# Patient Record
Sex: FEMALE | Race: OTHER | NOT HISPANIC OR LATINO
[De-identification: names, ages, dates, MRNs, and addresses within clinical notes are randomized per-mention and may not be internally consistent; named-entity substitution may affect disease eponyms.]

---

## 2019-03-27 ENCOUNTER — APPOINTMENT (OUTPATIENT)
Dept: PULMONOLOGY | Facility: CLINIC | Age: 79
End: 2019-03-27
Payer: MEDICARE

## 2019-03-27 VITALS
OXYGEN SATURATION: 97 % | SYSTOLIC BLOOD PRESSURE: 129 MMHG | BODY MASS INDEX: 28.93 KG/M2 | WEIGHT: 180 LBS | DIASTOLIC BLOOD PRESSURE: 82 MMHG | HEIGHT: 66 IN | HEART RATE: 77 BPM

## 2019-03-27 DIAGNOSIS — J45.909 UNSPECIFIED ASTHMA, UNCOMPLICATED: ICD-10-CM

## 2019-03-27 DIAGNOSIS — R06.2 WHEEZING: ICD-10-CM

## 2019-03-27 PROCEDURE — 94060 EVALUATION OF WHEEZING: CPT

## 2019-03-27 PROCEDURE — 99214 OFFICE O/P EST MOD 30 MIN: CPT | Mod: 25

## 2019-03-27 PROCEDURE — 71046 X-RAY EXAM CHEST 2 VIEWS: CPT

## 2019-03-27 RX ORDER — PREDNISONE 10 MG/1
10 TABLET ORAL
Qty: 12 | Refills: 0 | Status: DISCONTINUED | COMMUNITY
Start: 2019-03-22

## 2019-03-27 RX ORDER — ANASTROZOLE TABLETS 1 MG/1
1 TABLET ORAL
Qty: 90 | Refills: 0 | Status: DISCONTINUED | COMMUNITY
Start: 2018-11-27 | End: 2019-03-27

## 2019-03-27 RX ORDER — AZITHROMYCIN 250 MG/1
250 TABLET, FILM COATED ORAL
Qty: 6 | Refills: 0 | Status: DISCONTINUED | COMMUNITY
Start: 2019-03-22

## 2019-03-27 RX ORDER — LETROZOLE TABLETS 2.5 MG/1
2.5 TABLET, FILM COATED ORAL
Qty: 30 | Refills: 0 | Status: ACTIVE | COMMUNITY
Start: 2019-02-25

## 2019-03-27 RX ORDER — LEVOTHYROXINE SODIUM 0.07 MG/1
75 TABLET ORAL
Qty: 90 | Refills: 0 | Status: ACTIVE | COMMUNITY
Start: 2019-02-11

## 2019-03-27 RX ORDER — PREDNISONE 20 MG/1
20 TABLET ORAL
Qty: 8 | Refills: 0 | Status: DISCONTINUED | COMMUNITY
Start: 2019-03-20

## 2019-03-27 NOTE — PROCEDURE
[FreeTextEntry1] : Pulmonary function testing.\par There is a mild ventilatory impairment in a restrictive pattern. There is no significant bronchodilator response. \par \par NIOX 5\par \par PA and lateral chest radiograph reveals A normal heart and mediastinum with a tortuous aorta. There is mild hyperinflation and lung fields are clear bilaterally. Nodular density at the left lung base cannot be excluded. There is no significant pleural disease and bony structures are intact.\par \par There is no significant change compared to prior radiograph of August 9, 2017 except infusaport is no longer present. \par \par

## 2019-03-27 NOTE — HISTORY OF PRESENT ILLNESS
[FreeTextEntry1] : Developed cough 3/16 went to PMD sent home without treatment. Felt ill. \par Bad night. Pers. cough, sweats. Went to [facility] had neg. CXR\par discharged on prednisone and inhaler.\par Few days later went to [facility] tx inhaler Ventolin/prednisone 4/4/2/2/1/1 / Javonthro\par \par Now still with cough increased nocturnal.\par Cant get mucous out. Some wheezing. Not SOB.\par No fever. Significant upper airway congestion. PN Drip\par \par Some mild GERD.

## 2019-03-27 NOTE — PHYSICAL EXAM
[Lungs Percussion] : the lungs were normal to percussion [Abdomen Soft] : soft [Abdomen Tenderness] : non-tender [] : no hepato-splenomegaly [Nail Clubbing] : no clubbing of the fingernails [Cyanosis, Localized] : no localized cyanosis [FreeTextEntry1] : Few rhonchi and crackles bases.

## 2020-03-11 ENCOUNTER — APPOINTMENT (OUTPATIENT)
Dept: UROLOGY | Facility: CLINIC | Age: 80
End: 2020-03-11

## 2020-07-06 ENCOUNTER — APPOINTMENT (OUTPATIENT)
Dept: UROLOGY | Facility: CLINIC | Age: 80
End: 2020-07-06
Payer: MEDICARE

## 2020-07-06 VITALS
HEIGHT: 66 IN | WEIGHT: 187 LBS | TEMPERATURE: 99.9 F | RESPIRATION RATE: 17 BRPM | BODY MASS INDEX: 30.05 KG/M2 | DIASTOLIC BLOOD PRESSURE: 75 MMHG | SYSTOLIC BLOOD PRESSURE: 113 MMHG | HEART RATE: 78 BPM

## 2020-07-06 DIAGNOSIS — Z87.440 PERSONAL HISTORY OF URINARY (TRACT) INFECTIONS: ICD-10-CM

## 2020-07-06 PROCEDURE — 99204 OFFICE O/P NEW MOD 45 MIN: CPT

## 2020-07-06 RX ORDER — TRIAMTERENE AND HYDROCHLOROTHIAZIDE 25; 37.5 MG/1; MG/1
37.5-25 TABLET ORAL
Qty: 90 | Refills: 0 | Status: COMPLETED | COMMUNITY
Start: 2018-10-18 | End: 2020-07-06

## 2020-07-06 RX ORDER — ALBUTEROL SULFATE 90 UG/1
108 (90 BASE) AEROSOL, METERED RESPIRATORY (INHALATION)
Qty: 18 | Refills: 0 | Status: COMPLETED | COMMUNITY
Start: 2019-03-20 | End: 2020-07-06

## 2020-07-06 NOTE — ASSESSMENT
[FreeTextEntry1] : Recurrent UTI's\par restart cipro pending urine culture results\par urine culture sent\par \par she will try to obtain records (CT or ultrasound if done, urine culture )from hospital visit\par renal ultrasound ordered in case\par \par routine 3 months f/u appointment set.

## 2020-07-06 NOTE — HISTORY OF PRESENT ILLNESS
[Urinary Urgency] : urinary urgency [Urinary Frequency] : urinary frequency [Nocturia] : nocturia [None] : None [FreeTextEntry1] : 78y/o woman\par Hx of breast cancer treated with bilateral lumpectomy and chemotherapy and radiation. Now on Letrozole\par Here for evaluation of recurrent bouts of cystitis. Hospitalized in March 2020 before COVID. She was treated with antibiotics and then discharged. She had another episode recently and was treated with cipro. she stopped Cipro and then experienced a resurgence of her symptoms--frequency, urgency, no dysuria or hematuria.\par \par Unrelated c/o right sided chest pain, was evaluated by PCP and EKG was normal. Recent CXR was  from march 2020 was normal as well.\par \par Latest COVID test July 1, 20202 negative.\par \par Meds: Letrozole, Levothyroxine, Lipitor, Triamterene/HCTZ, Claritin prn.\par All: NKDA\par \par Exam: no CVAT, abd soft NT, ND, right chest pain localized to rib

## 2020-07-06 NOTE — PHYSICAL EXAM
[General Appearance - Well Nourished] : well nourished [General Appearance - Well Developed] : well developed [Normal Appearance] : normal appearance [General Appearance - In No Acute Distress] : no acute distress [Edema] : no peripheral edema [Well Groomed] : well groomed [] : no respiratory distress [Respiration, Rhythm And Depth] : normal respiratory rhythm and effort [Exaggerated Use Of Accessory Muscles For Inspiration] : no accessory muscle use [Abdomen Soft] : soft [Abdomen Tenderness] : non-tender [Costovertebral Angle Tenderness] : no ~M costovertebral angle tenderness [Urinary Bladder Findings] : the bladder was normal on palpation [Normal Station and Gait] : the gait and station were normal for the patient's age [Skin Color & Pigmentation] : normal skin color and pigmentation [Oriented To Time, Place, And Person] : oriented to person, place, and time [Affect] : the affect was normal [Mood] : the mood was normal [Not Anxious] : not anxious [No Palpable Adenopathy] : no palpable adenopathy [Cervical Lymph Nodes Enlarged Anterior Bilaterally] : anterior cervical [Cervical Lymph Nodes Enlarged Posterior Bilaterally] : posterior cervical [Supraclavicular Lymph Nodes Enlarged Bilaterally] : supraclavicular [Axillary Lymph Nodes Enlarged Bilaterally] : axillary [Femoral Lymph Nodes Enlarged Bilaterally] : femoral [Inguinal Lymph Nodes Enlarged Bilaterally] : inguinal [No Focal Deficits] : no focal deficits

## 2020-07-07 LAB
APPEARANCE: CLEAR
BACTERIA: NEGATIVE
BILIRUBIN URINE: NEGATIVE
BLOOD URINE: NORMAL
COLOR: YELLOW
GLUCOSE QUALITATIVE U: NEGATIVE
HYALINE CASTS: 3 /LPF
KETONES URINE: NORMAL
LEUKOCYTE ESTERASE URINE: NEGATIVE
MICROSCOPIC-UA: NORMAL
NITRITE URINE: NEGATIVE
PH URINE: 7.5
PROTEIN URINE: ABNORMAL
RED BLOOD CELLS URINE: 2 /HPF
SPECIFIC GRAVITY URINE: 1.02
SQUAMOUS EPITHELIAL CELLS: 2 /HPF
UROBILINOGEN URINE: NORMAL
WHITE BLOOD CELLS URINE: 2 /HPF

## 2020-07-08 LAB — BACTERIA UR CULT: NORMAL

## 2020-07-10 ENCOUNTER — APPOINTMENT (OUTPATIENT)
Dept: PULMONOLOGY | Facility: CLINIC | Age: 80
End: 2020-07-10
Payer: MEDICARE

## 2020-07-10 VITALS
WEIGHT: 185 LBS | DIASTOLIC BLOOD PRESSURE: 90 MMHG | TEMPERATURE: 98.3 F | HEART RATE: 78 BPM | HEIGHT: 63 IN | OXYGEN SATURATION: 96 % | SYSTOLIC BLOOD PRESSURE: 161 MMHG | BODY MASS INDEX: 32.78 KG/M2 | RESPIRATION RATE: 16 BRPM

## 2020-07-10 PROCEDURE — 99204 OFFICE O/P NEW MOD 45 MIN: CPT

## 2020-07-10 PROCEDURE — 99214 OFFICE O/P EST MOD 30 MIN: CPT

## 2020-07-10 RX ORDER — ALPRAZOLAM 0.25 MG/1
0.25 TABLET ORAL
Qty: 20 | Refills: 0 | Status: ACTIVE | COMMUNITY
Start: 2020-07-10 | End: 1900-01-01

## 2020-07-10 RX ORDER — TRIAMTERENE AND HYDROCHLOROTHIAZIDE 37.5; 25 MG/1; MG/1
37.5-25 CAPSULE ORAL
Refills: 0 | Status: ACTIVE | COMMUNITY
Start: 2020-07-10

## 2020-07-10 RX ORDER — CIPROFLOXACIN HYDROCHLORIDE 500 MG/1
500 TABLET, FILM COATED ORAL
Qty: 14 | Refills: 0 | Status: DISCONTINUED | COMMUNITY
Start: 2020-07-06 | End: 2020-07-10

## 2020-07-10 NOTE — PHYSICAL EXAM
[No Acute Distress] : no acute distress [Normal Oropharynx] : normal oropharynx [Normal S1, S2] : normal s1, s2 [Clear to Auscultation Bilaterally] : clear to auscultation bilaterally [Normal to Percussion] : normal to percussion [No Abnormalities] : no abnormalities [No Cyanosis] : no cyanosis [No Clubbing] : no clubbing [No Edema] : no edema

## 2020-07-13 NOTE — ASSESSMENT
[FreeTextEntry1] : Trial of PPI\par Patient to obtain and bring in CD of CT Chest and make further recommendations.

## 2020-07-13 NOTE — REASON FOR VISIT
[Follow-Up] : a follow-up visit [Abnormal CXR/ Chest CT] : an abnormal CXR/ chest CT [TextBox_44] : cough

## 2020-07-13 NOTE — DISCUSSION/SUMMARY
[FreeTextEntry1] : Lung lesions.  CAT scan films are not available for review.  Based on report suspicion of metastatic breast cancer.  Infectious inflammatory etiology cannot be excluded.  [facility] recommended a needle biopsy which likely is a reasonable approach.\par Cough of unclear etiology.  May be related to GERD.  Less likely related to pulmonary lesions.  Doubt cough variant asthma.  Cannot exclude upper airway contribution.

## 2020-07-13 NOTE — HISTORY OF PRESENT ILLNESS
[Never] : never [TextBox_4] : FLEX KINGSTON is a 79 year old  F referred for pulmonary evaluation for abnormal CAT scan.\par \par Patient last seen in our office in August 2017.\par  Patient complaining of cough mostly dry  for 1 week , went to [facility] with chest discomfort and had cta. covid negative. Was hospitalized in feb with a uti\par off Symbicort from last visit for over 1 year. Takes Claritin prn\par SIgnificant stress and sleeping poorly since hospital. \par brought disc to [facility] has not seen them as yet , breast oncologist\par Tx lumpectomy chemo and RT in [id number].\par ? Had RT to both breasts. \par \par No wheezing or SOB.\par ?GERD\par \par Called from [facility] and rec. bx of lung.

## 2020-09-09 ENCOUNTER — APPOINTMENT (OUTPATIENT)
Dept: PULMONOLOGY | Facility: CLINIC | Age: 80
End: 2020-09-09
Payer: MEDICARE

## 2020-09-09 VITALS
OXYGEN SATURATION: 99 % | HEART RATE: 65 BPM | TEMPERATURE: 97.8 F | DIASTOLIC BLOOD PRESSURE: 86 MMHG | SYSTOLIC BLOOD PRESSURE: 139 MMHG

## 2020-09-09 DIAGNOSIS — Z20.828 CONTACT WITH AND (SUSPECTED) EXPOSURE TO OTHER VIRAL COMMUNICABLE DISEASES: ICD-10-CM

## 2020-09-09 DIAGNOSIS — R91.8 OTHER NONSPECIFIC ABNORMAL FINDING OF LUNG FIELD: ICD-10-CM

## 2020-09-09 PROCEDURE — 36415 COLL VENOUS BLD VENIPUNCTURE: CPT | Mod: CS

## 2020-09-09 PROCEDURE — 94010 BREATHING CAPACITY TEST: CPT

## 2020-09-09 PROCEDURE — 71046 X-RAY EXAM CHEST 2 VIEWS: CPT

## 2020-09-09 PROCEDURE — 99214 OFFICE O/P EST MOD 30 MIN: CPT | Mod: CS,25

## 2020-09-09 RX ORDER — HYDROCODONE BITARTRATE AND HOMATROPINE METHYLBROMIDE 5; 1.5 MG/5ML; MG/5ML
5-1.5 SOLUTION ORAL
Qty: 150 | Refills: 0 | Status: DISCONTINUED | COMMUNITY
Start: 2020-07-10 | End: 2020-09-09

## 2020-09-09 NOTE — PHYSICAL EXAM
[No Acute Distress] : no acute distress [Normal Oropharynx] : normal oropharynx [Normal S1, S2] : normal s1, s2 [Clear to Auscultation Bilaterally] : clear to auscultation bilaterally [Normal to Percussion] : normal to percussion [No Clubbing] : no clubbing [No Abnormalities] : no abnormalities [No Cyanosis] : no cyanosis [No Edema] : no edema

## 2020-09-10 PROBLEM — R91.8 LUNG NODULES: Status: ACTIVE | Noted: 2020-07-10

## 2020-09-10 NOTE — DISCUSSION/SUMMARY
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  No significant respiratory symptoms.

## 2020-09-10 NOTE — HISTORY OF PRESENT ILLNESS
[Never] : never [TextBox_4] : S/p lung biopsy.  Transthoracic needle biopsy.\par By history positive organizing pneumonia.\par Bx was 7/17\par \par Feeling OK.\par Positive fatigue.\par No cough, wheeze or SOB.\par Otherwise well.\par

## 2020-09-10 NOTE — ASSESSMENT
[FreeTextEntry1] : Obtain and review all records.\par Patient to return for lung function testing.\par Labs drawn in office today\par Recommendations regarding need for treatment based upon above.\par \par \par Discussed with patient and daughter

## 2020-09-12 LAB — SARS-COV-2 N GENE NPH QL NAA+PROBE: NOT DETECTED

## 2020-09-14 ENCOUNTER — APPOINTMENT (OUTPATIENT)
Dept: PULMONOLOGY | Facility: CLINIC | Age: 80
End: 2020-09-14
Payer: MEDICARE

## 2020-09-14 VITALS
TEMPERATURE: 98.3 F | RESPIRATION RATE: 14 BRPM | DIASTOLIC BLOOD PRESSURE: 70 MMHG | SYSTOLIC BLOOD PRESSURE: 121 MMHG | HEART RATE: 68 BPM | OXYGEN SATURATION: 95 %

## 2020-09-14 LAB
ANA SER IF-ACNC: NEGATIVE
CRP SERPL-MCNC: 0.15 MG/DL
ENA SCL70 IGG SER IA-ACNC: <0.2 AL
POCT - HEMOGLOBIN (HGB), QUANTITATIVE, TRANSCUTANEOUS: 13.5

## 2020-09-14 PROCEDURE — 88738 HGB QUANT TRANSCUTANEOUS: CPT

## 2020-09-14 PROCEDURE — 99213 OFFICE O/P EST LOW 20 MIN: CPT | Mod: 25

## 2020-09-14 PROCEDURE — 94010 BREATHING CAPACITY TEST: CPT

## 2020-09-14 PROCEDURE — 94729 DIFFUSING CAPACITY: CPT

## 2020-09-14 PROCEDURE — 94727 GAS DIL/WSHOT DETER LNG VOL: CPT

## 2020-09-15 NOTE — PHYSICAL EXAM
[No Acute Distress] : no acute distress [Normal Oropharynx] : normal oropharynx [Normal to Percussion] : normal to percussion [Clear to Auscultation Bilaterally] : clear to auscultation bilaterally [Normal S1, S2] : normal s1, s2 [No Abnormalities] : no abnormalities [No Cyanosis] : no cyanosis [No Clubbing] : no clubbing [No Edema] : no edema

## 2020-09-15 NOTE — ASSESSMENT
[FreeTextEntry1] : Due to presence of normal lung function and lack of symptoms I do not feel compelled to treat this patient with corticosteroid therapy for organizing pneumonia at this time.\par This option was discussed with patient and daughter.  One benefit of corticosteroids besides treatment would be resolution of radiographic abnormalities would confirm diagnosis.\par Patient however does not wish to take steroids at this time.  We will closely observe.\par We will follow-up in a few months.  Patient is aware and urged to return sooner should she develop any new respiratory symptoms.\par \par \par Follow-up CAT scan and reevaluation in early November if no change in symptom complex.

## 2020-09-15 NOTE — PROCEDURE
[FreeTextEntry1] : 09/14/2020\par Pulmonary function testing\par FEV1, FVC, and FEV1/FVC are within normal limits. TLC and subdivisions are normal. RV/TLC ratio is normal. Single breath diffusion capacity is normal.

## 2020-09-15 NOTE — DISCUSSION/SUMMARY
[FreeTextEntry1] : Lung lesions likely related to organizing pneumonia.\par Neoplastic process cannot be absolutely excluded in light of biopsy being done as needle biopsy and multiple lesions.\par Normal lung function and asymptomatic.

## 2020-10-05 ENCOUNTER — APPOINTMENT (OUTPATIENT)
Dept: UROLOGY | Facility: CLINIC | Age: 80
End: 2020-10-05

## 2020-12-14 ENCOUNTER — APPOINTMENT (OUTPATIENT)
Dept: PULMONOLOGY | Facility: CLINIC | Age: 80
End: 2020-12-14

## 2020-12-23 PROBLEM — Z87.440 HISTORY OF URINARY TRACT INFECTION: Status: RESOLVED | Noted: 2020-07-06 | Resolved: 2020-12-23

## 2021-02-04 ENCOUNTER — APPOINTMENT (OUTPATIENT)
Dept: PULMONOLOGY | Facility: CLINIC | Age: 81
End: 2021-02-04
Payer: MEDICARE

## 2021-02-04 PROCEDURE — 99443: CPT | Mod: CS,95

## 2021-02-04 NOTE — HISTORY OF PRESENT ILLNESS
[Verbal consent obtained from patient] : the patient, [unfilled] [FreeTextEntry1] : 6 days ago got vaccine\par On way home started to fee nausea.\par Some arm pain.\par \par Next Am felt off.\par Tired and not feeling great.\par Developed a cough.\par Had COVID PCR\par Called today COVID positive.\par \par Positive cough.\par No significant SOB\par No fever\par Has sat monitor. Sat is \par No GI\par Some loss of apetitte.\par No loss of taste or smell.\par Sat 9597 pulse 85.\par \par COVID\par Co morbidiy organizing pneumonia.\par \par CROWN\par labs\par Monoclonal antibody.\par ? Remdesivir trial. Refusing.\par Baby ASA\par Nebs.\par Famotidine 20 BID\par \par

## 2021-02-09 ENCOUNTER — APPOINTMENT (OUTPATIENT)
Dept: PULMONOLOGY | Facility: CLINIC | Age: 81
End: 2021-02-09
Payer: MEDICARE

## 2021-02-09 PROCEDURE — 99443: CPT | Mod: CS,95

## 2021-02-09 NOTE — HISTORY OF PRESENT ILLNESS
[Home] : at home, [unfilled] , at the time of the visit. [Medical Office: ([facility])___] : at the medical office located in  [Verbal consent obtained from patient] : the patient, [unfilled] [FreeTextEntry1] : COVID positive 2/4\par Got monoclonal antibody yesterday.\par Persistent cough. Significant. No mucous production\par Some SOB\par Sats not measured today. Yesterday normal. \par Feels issue with lungs. \par No fever.\par Was called for labs. Coming today.\par \par \par For CXR ordered.\par For labs\par Continue Baby ASA\par Continue Pepcid.\par May need steroids.\par \par Close observation.\par \par \par

## 2021-02-10 ENCOUNTER — NON-APPOINTMENT (OUTPATIENT)
Age: 81
End: 2021-02-10

## 2021-02-10 LAB
ALBUMIN SERPL ELPH-MCNC: 4.1 G/DL
ALP BLD-CCNC: 78 U/L
ALT SERPL-CCNC: 70 U/L
ANION GAP SERPL CALC-SCNC: 24 MMOL/L
AST SERPL-CCNC: 78 U/L
BASOPHILS # BLD AUTO: 0.03 K/UL
BASOPHILS NFR BLD AUTO: 0.4 %
BILIRUB SERPL-MCNC: 0.8 MG/DL
BUN SERPL-MCNC: 19 MG/DL
CALCIUM SERPL-MCNC: 9.2 MG/DL
CHLORIDE SERPL-SCNC: 95 MMOL/L
CO2 SERPL-SCNC: 21 MMOL/L
CREAT SERPL-MCNC: 1.17 MG/DL
CRP SERPL-MCNC: 9.73 MG/DL
DEPRECATED D DIMER PPP IA-ACNC: 395 NG/ML DDU
EOSINOPHIL # BLD AUTO: 0.07 K/UL
EOSINOPHIL NFR BLD AUTO: 0.9 %
FERRITIN SERPL-MCNC: 934 NG/ML
GLUCOSE SERPL-MCNC: 160 MG/DL
HCT VFR BLD CALC: 44.5 %
HGB BLD-MCNC: 14.9 G/DL
IMM GRANULOCYTES NFR BLD AUTO: 0.5 %
LYMPHOCYTES # BLD AUTO: 1.69 K/UL
LYMPHOCYTES NFR BLD AUTO: 21.3 %
MAN DIFF?: NORMAL
MCHC RBC-ENTMCNC: 30.2 PG
MCHC RBC-ENTMCNC: 33.5 GM/DL
MCV RBC AUTO: 90.3 FL
MONOCYTES # BLD AUTO: 0.46 K/UL
MONOCYTES NFR BLD AUTO: 5.8 %
NEUTROPHILS # BLD AUTO: 5.66 K/UL
NEUTROPHILS NFR BLD AUTO: 71.1 %
PLATELET # BLD AUTO: 276 K/UL
POTASSIUM SERPL-SCNC: 2.8 MMOL/L
PROCALCITONIN SERPL-MCNC: 0.19 NG/ML
PROT SERPL-MCNC: 7.1 G/DL
RBC # BLD: 4.93 M/UL
RBC # FLD: 14 %
SODIUM SERPL-SCNC: 140 MMOL/L
WBC # FLD AUTO: 7.95 K/UL

## 2021-02-11 RX ORDER — HYDROCODONE BITARTRATE AND HOMATROPINE METHYLBROMIDE 5; 1.5 MG/5ML; MG/5ML
5-1.5 SOLUTION ORAL
Qty: 300 | Refills: 0 | Status: ACTIVE | COMMUNITY
Start: 2021-02-11 | End: 1900-01-01

## 2021-02-13 ENCOUNTER — NON-APPOINTMENT (OUTPATIENT)
Age: 81
End: 2021-02-13

## 2021-02-13 LAB
ANION GAP SERPL CALC-SCNC: 15 MMOL/L
BUN SERPL-MCNC: 21 MG/DL
CALCIUM SERPL-MCNC: 9.1 MG/DL
CHLORIDE SERPL-SCNC: 99 MMOL/L
CO2 SERPL-SCNC: 24 MMOL/L
CREAT SERPL-MCNC: 1.08 MG/DL
GLUCOSE SERPL-MCNC: 91 MG/DL
POTASSIUM SERPL-SCNC: 3.9 MMOL/L
SODIUM SERPL-SCNC: 138 MMOL/L

## 2021-02-13 NOTE — HISTORY OF PRESENT ILLNESS
[Home] : at home, [unfilled] , at the time of the visit. [Other Location: e.g. Home (Enter Location, City,State)___] : at [unfilled] [FreeTextEntry1] : Feeling relatively stable except bad cough\par No fever\par Sats good\par Repeat potassium improved\par Took medrol marie\par Had monoclonal antibody\par \par Continue present management and observe\par Follow sats\par

## 2021-02-17 ENCOUNTER — NON-APPOINTMENT (OUTPATIENT)
Age: 81
End: 2021-02-17

## 2021-02-17 ENCOUNTER — APPOINTMENT (OUTPATIENT)
Dept: PULMONOLOGY | Facility: CLINIC | Age: 81
End: 2021-02-17

## 2021-02-19 ENCOUNTER — APPOINTMENT (OUTPATIENT)
Dept: PULMONOLOGY | Facility: CLINIC | Age: 81
End: 2021-02-19

## 2021-02-19 NOTE — PLAN
[FreeTextEntry1] : Feeling much better\par Still fatigue.\par No fever\par No SOB\par Significant improvement in cough.\par \par For office f/u\par \par

## 2021-02-23 ENCOUNTER — APPOINTMENT (OUTPATIENT)
Dept: PULMONOLOGY | Facility: CLINIC | Age: 81
End: 2021-02-23
Payer: MEDICARE

## 2021-02-23 VITALS
TEMPERATURE: 97.4 F | SYSTOLIC BLOOD PRESSURE: 167 MMHG | HEART RATE: 70 BPM | OXYGEN SATURATION: 98 % | HEIGHT: 63 IN | WEIGHT: 185 LBS | DIASTOLIC BLOOD PRESSURE: 82 MMHG | BODY MASS INDEX: 32.78 KG/M2

## 2021-02-23 PROCEDURE — 71046 X-RAY EXAM CHEST 2 VIEWS: CPT

## 2021-02-23 PROCEDURE — 99214 OFFICE O/P EST MOD 30 MIN: CPT | Mod: CS,25

## 2021-02-23 NOTE — DISCUSSION/SUMMARY
[FreeTextEntry1] : Status post Covid virus infection with Covid pneumonia clinically and radiographically resolving.\par Prior lung lesions likely related to organizing pneumonia.\par Neoplastic process cannot be absolutely excluded in light of biopsy being done as needle biopsy and multiple lesions.\par

## 2021-02-23 NOTE — ASSESSMENT
[FreeTextEntry1] : Observation.\par Follow-up in 6 to 8 weeks for repeat radiograph and lung function testing.\par Follow-up sooner on a as needed basis.

## 2021-02-23 NOTE — PHYSICAL EXAM
[No Acute Distress] : no acute distress [Supple] : supple [No JVD] : no jvd [Normal S1, S2] : normal s1, s2 [No Murmurs] : no murmurs [Clear to Auscultation Bilaterally] : clear to auscultation bilaterally [Normal to Percussion] : normal to percussion [No Abnormalities] : no abnormalities [Benign] : benign [No Clubbing] : no clubbing [No Cyanosis] : no cyanosis [No Edema] : no edema

## 2021-02-23 NOTE — HISTORY OF PRESENT ILLNESS
[TextBox_4] : S/P COVID\par Had repeat swab negative.\par Going Friday to dentist.\par Feeling much better.\par Still some cough has significantly improved. \par No SOB.\par \par

## 2021-03-12 ENCOUNTER — RX RENEWAL (OUTPATIENT)
Age: 81
End: 2021-03-12

## 2021-03-12 RX ORDER — POTASSIUM CHLORIDE 1500 MG/1
20 TABLET, FILM COATED, EXTENDED RELEASE ORAL
Qty: 60 | Refills: 0 | Status: ACTIVE | COMMUNITY
Start: 2021-02-10 | End: 1900-01-01

## 2021-04-16 ENCOUNTER — APPOINTMENT (OUTPATIENT)
Dept: PULMONOLOGY | Facility: CLINIC | Age: 81
End: 2021-04-16
Payer: MEDICARE

## 2021-04-16 ENCOUNTER — LABORATORY RESULT (OUTPATIENT)
Age: 81
End: 2021-04-16

## 2021-04-16 VITALS — SYSTOLIC BLOOD PRESSURE: 119 MMHG | HEART RATE: 67 BPM | OXYGEN SATURATION: 98 % | DIASTOLIC BLOOD PRESSURE: 75 MMHG

## 2021-04-16 DIAGNOSIS — E87.6 HYPOKALEMIA: ICD-10-CM

## 2021-04-16 PROCEDURE — 71046 X-RAY EXAM CHEST 2 VIEWS: CPT

## 2021-04-16 PROCEDURE — 99213 OFFICE O/P EST LOW 20 MIN: CPT | Mod: 25,CS

## 2021-04-18 NOTE — PHYSICAL EXAM
[No Acute Distress] : no acute distress [Normal S1, S2] : normal s1, s2 [Clear to Auscultation Bilaterally] : clear to auscultation bilaterally [Normal to Percussion] : normal to percussion [No Abnormalities] : no abnormalities [No Clubbing] : no clubbing [No Cyanosis] : no cyanosis [No Edema] : no edema [Supple] : supple [No JVD] : no jvd [No Murmurs] : no murmurs [Benign] : benign [Not Tender] : not tender [No HSM] : no hsm

## 2021-04-18 NOTE — ASSESSMENT
[FreeTextEntry1] : Observation.\par Repeat lung function testing on return to office.\par Labs drawn in office today\par

## 2021-04-18 NOTE — DISCUSSION/SUMMARY
[FreeTextEntry1] : Status post Covid virus infection with Covid pneumonia clinically and radiographically resolved\par Prior lung lesions likely related to organizing pneumonia.\par Neoplastic process cannot be absolutely excluded in light of biopsy being done as needle biopsy and multiple lesions.\par

## 2021-04-18 NOTE — HISTORY OF PRESENT ILLNESS
[TextBox_4] : Feeling well\par \par No significant cough, wheezing, chest pain or SOB.\par Symptoms of COVID infection resolved.

## 2021-04-19 LAB
ALBUMIN SERPL ELPH-MCNC: 4.6 G/DL
ALP BLD-CCNC: 74 U/L
ALT SERPL-CCNC: 16 U/L
ANION GAP SERPL CALC-SCNC: 14 MMOL/L
AST SERPL-CCNC: 20 U/L
BASOPHILS # BLD AUTO: 0.04 K/UL
BASOPHILS NFR BLD AUTO: 0.6 %
BILIRUB SERPL-MCNC: 0.6 MG/DL
BUN SERPL-MCNC: 14 MG/DL
CALCIUM SERPL-MCNC: 9.6 MG/DL
CHLORIDE SERPL-SCNC: 105 MMOL/L
CO2 SERPL-SCNC: 24 MMOL/L
CREAT SERPL-MCNC: 0.83 MG/DL
CRP SERPL-MCNC: <3 MG/L
DEPRECATED D DIMER PPP IA-ACNC: 221 NG/ML DDU
EOSINOPHIL # BLD AUTO: 0.19 K/UL
EOSINOPHIL NFR BLD AUTO: 2.8 %
FERRITIN SERPL-MCNC: 145 NG/ML
GLUCOSE SERPL-MCNC: 93 MG/DL
HCT VFR BLD CALC: 43.5 %
HGB BLD-MCNC: 14.2 G/DL
IMM GRANULOCYTES NFR BLD AUTO: 0.3 %
LYMPHOCYTES # BLD AUTO: 1.25 K/UL
LYMPHOCYTES NFR BLD AUTO: 18.5 %
MAN DIFF?: NORMAL
MCHC RBC-ENTMCNC: 31.6 PG
MCHC RBC-ENTMCNC: 32.6 GM/DL
MCV RBC AUTO: 96.7 FL
MONOCYTES # BLD AUTO: 0.38 K/UL
MONOCYTES NFR BLD AUTO: 5.6 %
NEUTROPHILS # BLD AUTO: 4.87 K/UL
NEUTROPHILS NFR BLD AUTO: 72.2 %
PLATELET # BLD AUTO: 204 K/UL
POTASSIUM SERPL-SCNC: 4.7 MMOL/L
PROT SERPL-MCNC: 6.7 G/DL
RBC # BLD: 4.5 M/UL
RBC # FLD: 14.8 %
SODIUM SERPL-SCNC: 142 MMOL/L
WBC # FLD AUTO: 6.75 K/UL

## 2021-07-19 DIAGNOSIS — Z23 ENCOUNTER FOR IMMUNIZATION: ICD-10-CM

## 2021-07-20 ENCOUNTER — APPOINTMENT (OUTPATIENT)
Dept: PULMONOLOGY | Facility: CLINIC | Age: 81
End: 2021-07-20
Payer: MEDICARE

## 2021-07-20 VITALS
RESPIRATION RATE: 14 BRPM | WEIGHT: 178 LBS | BODY MASS INDEX: 31.54 KG/M2 | HEIGHT: 63 IN | TEMPERATURE: 97.3 F | DIASTOLIC BLOOD PRESSURE: 76 MMHG | HEART RATE: 65 BPM | OXYGEN SATURATION: 97 % | SYSTOLIC BLOOD PRESSURE: 126 MMHG

## 2021-07-20 DIAGNOSIS — U07.1 COVID-19: ICD-10-CM

## 2021-07-20 PROCEDURE — 94729 DIFFUSING CAPACITY: CPT

## 2021-07-20 PROCEDURE — 94727 GAS DIL/WSHOT DETER LNG VOL: CPT

## 2021-07-20 PROCEDURE — ZZZZZ: CPT

## 2021-07-20 PROCEDURE — 94010 BREATHING CAPACITY TEST: CPT

## 2021-07-20 PROCEDURE — 99213 OFFICE O/P EST LOW 20 MIN: CPT | Mod: CS,25

## 2021-07-20 NOTE — PHYSICAL EXAM
[No Acute Distress] : no acute distress [Supple] : supple [No JVD] : no jvd [Normal S1, S2] : normal s1, s2 [No Murmurs] : no murmurs [Clear to Auscultation Bilaterally] : clear to auscultation bilaterally [Normal to Percussion] : normal to percussion [No Abnormalities] : no abnormalities [Not Tender] : not tender [Benign] : benign [No HSM] : no hsm [No Clubbing] : no clubbing [No Cyanosis] : no cyanosis [No Edema] : no edema

## 2021-07-21 NOTE — DISCUSSION/SUMMARY
[FreeTextEntry1] : Status post Covid virus infection with Covid pneumonia clinically and radiographically resolved.\par Lung function back to baseline.\par Prior lung lesions likely related to organizing pneumonia.\par Neoplastic process cannot be absolutely excluded

## 2021-07-21 NOTE — HISTORY OF PRESENT ILLNESS
[TextBox_4] : Feeling well\par \par No significant cough, wheezing, chest pain or SOB.\par Symptoms of COVID infection resolved. \par No residual abnormalities\par Got vaccinated. \par \par Going to [facility] 9/10

## 2021-07-21 NOTE — PROCEDURE
[FreeTextEntry1] : 07/20/2021\par Pulmonary function testing\par FEV1, FVC, and FEV1/FVC are within normal limits. TLC and subdivisions are normal. RV/TLC ratio is normal. Single breath diffusion capacity is normal. \par PFT attached relatively stable function.\par

## 2021-07-21 NOTE — ASSESSMENT
[FreeTextEntry1] : Observation.\par For follow-up CAT scan via [facility].  Recommended patient go in September or October.

## 2021-07-30 ENCOUNTER — APPOINTMENT (OUTPATIENT)
Dept: PULMONOLOGY | Facility: CLINIC | Age: 81
End: 2021-07-30
Payer: MEDICARE

## 2021-07-30 VITALS
OXYGEN SATURATION: 95 % | SYSTOLIC BLOOD PRESSURE: 134 MMHG | HEART RATE: 77 BPM | DIASTOLIC BLOOD PRESSURE: 81 MMHG | TEMPERATURE: 97.8 F

## 2021-07-30 DIAGNOSIS — B34.9 VIRAL INFECTION, UNSPECIFIED: ICD-10-CM

## 2021-07-30 PROCEDURE — 99213 OFFICE O/P EST LOW 20 MIN: CPT | Mod: 25

## 2021-07-30 PROCEDURE — 36415 COLL VENOUS BLD VENIPUNCTURE: CPT

## 2021-07-30 RX ORDER — FLUTICASONE PROPIONATE 50 UG/1
50 SPRAY, METERED NASAL
Qty: 1 | Refills: 4 | Status: ACTIVE | COMMUNITY
Start: 2021-07-30 | End: 1900-01-01

## 2021-07-30 NOTE — ASSESSMENT
[FreeTextEntry1] : Add Flonase\par Allegra\par RVP\par Follow-up in 1 to 2 weeks if not improved.\par For CT chest.

## 2021-07-30 NOTE — DISCUSSION/SUMMARY
[FreeTextEntry1] : Patient symptoms seem predominantly upper airway in origin.\par More likely related to viral syndrome then other etiologies including exacerbation of organizing pneumonia.

## 2021-07-30 NOTE — HISTORY OF PRESENT ILLNESS
[TextBox_4] : \par \par \par 5-7 days of cough and upper airway congestion\par No fever\par Non productive\par Feels from upper airway\par [place] in city [place] x 2 treated Doxy and prednisone 20 OD for 5 days.Had CXR told negative.\par \par Has appt  8/9 for CT.\par \par Very agitated regarding above.

## 2021-08-02 LAB
BASOPHILS # BLD AUTO: 0.03 K/UL
BASOPHILS NFR BLD AUTO: 0.3 %
EOSINOPHIL # BLD AUTO: 0.03 K/UL
EOSINOPHIL NFR BLD AUTO: 0.3 %
HCT VFR BLD CALC: 45 %
HGB BLD-MCNC: 15.7 G/DL
IMM GRANULOCYTES NFR BLD AUTO: 0.9 %
LYMPHOCYTES # BLD AUTO: 1.11 K/UL
LYMPHOCYTES NFR BLD AUTO: 10.8 %
MAN DIFF?: NORMAL
MCHC RBC-ENTMCNC: 31.7 PG
MCHC RBC-ENTMCNC: 34.9 GM/DL
MCV RBC AUTO: 90.7 FL
MONOCYTES # BLD AUTO: 0.72 K/UL
MONOCYTES NFR BLD AUTO: 7 %
NEUTROPHILS # BLD AUTO: 8.32 K/UL
NEUTROPHILS NFR BLD AUTO: 80.7 %
PLATELET # BLD AUTO: 286 K/UL
RAPID RVP RESULT: DETECTED
RBC # BLD: 4.96 M/UL
RBC # FLD: 14.6 %
RV+EV RNA SPEC QL NAA+PROBE: DETECTED
SARS-COV-2 RNA PNL RESP NAA+PROBE: NOT DETECTED
WBC # FLD AUTO: 10.3 K/UL

## 2021-08-09 ENCOUNTER — APPOINTMENT (OUTPATIENT)
Dept: CT IMAGING | Facility: CLINIC | Age: 81
End: 2021-08-09
Payer: MEDICARE

## 2021-08-09 ENCOUNTER — OUTPATIENT (OUTPATIENT)
Dept: OUTPATIENT SERVICES | Facility: HOSPITAL | Age: 81
LOS: 1 days | End: 2021-08-09
Payer: MEDICARE

## 2021-08-09 DIAGNOSIS — J84.89 OTHER SPECIFIED INTERSTITIAL PULMONARY DISEASES: ICD-10-CM

## 2021-08-09 PROCEDURE — 71250 CT THORAX DX C-: CPT | Mod: 26,MH

## 2021-08-09 PROCEDURE — 71250 CT THORAX DX C-: CPT

## 2022-01-18 ENCOUNTER — APPOINTMENT (OUTPATIENT)
Dept: PULMONOLOGY | Facility: CLINIC | Age: 82
End: 2022-01-18

## 2022-06-20 ENCOUNTER — APPOINTMENT (OUTPATIENT)
Dept: PULMONOLOGY | Facility: CLINIC | Age: 82
End: 2022-06-20
Payer: MEDICARE

## 2022-06-20 VITALS
HEART RATE: 64 BPM | SYSTOLIC BLOOD PRESSURE: 145 MMHG | TEMPERATURE: 97.6 F | DIASTOLIC BLOOD PRESSURE: 80 MMHG | OXYGEN SATURATION: 95 %

## 2022-06-20 DIAGNOSIS — R05.9 COUGH, UNSPECIFIED: ICD-10-CM

## 2022-06-20 PROCEDURE — 99214 OFFICE O/P EST MOD 30 MIN: CPT | Mod: 25

## 2022-06-20 PROCEDURE — 71046 X-RAY EXAM CHEST 2 VIEWS: CPT

## 2022-06-20 RX ORDER — PREDNISONE 10 MG/1
10 TABLET ORAL
Qty: 18 | Refills: 0 | Status: ACTIVE | COMMUNITY
Start: 2022-06-20 | End: 1900-01-01

## 2022-06-20 RX ORDER — NITROFURANTOIN (MONOHYDRATE/MACROCRYSTALS) 25; 75 MG/1; MG/1
100 CAPSULE ORAL
Qty: 14 | Refills: 0 | Status: DISCONTINUED | COMMUNITY
Start: 2022-02-19

## 2022-06-20 RX ORDER — OMEPRAZOLE 40 MG/1
40 CAPSULE, DELAYED RELEASE ORAL
Qty: 30 | Refills: 0 | Status: ACTIVE | COMMUNITY
Start: 2022-06-20 | End: 1900-01-01

## 2022-06-20 NOTE — CONSULT LETTER
[Dear  ___] : Dear ~DEJAH, [Consult Closing:] : Thank you very much for allowing me to participate in the care of this patient.  If you have any questions, please do not hesitate to contact me. [Sincerely,] : Sincerely, [FreeTextEntry2] : Tucker Ramos MD [FreeTextEntry3] : Angelo Pavon MD FCCP\par

## 2022-06-20 NOTE — PHYSICAL EXAM
[No Acute Distress] : no acute distress [Supple] : supple [No JVD] : no jvd [Normal S1, S2] : normal s1, s2 [No Murmurs] : no murmurs [Normal to Percussion] : normal to percussion [No Abnormalities] : no abnormalities [Benign] : benign [Not Tender] : not tender [No HSM] : no hsm [No Clubbing] : no clubbing [No Cyanosis] : no cyanosis [No Edema] : no edema [TextBox_68] : rhonchi right bas

## 2022-06-20 NOTE — ASSESSMENT
[FreeTextEntry1] : Course of prednisone.\par Trial of omeprazole prior to dinner.\par Follow-up in few weeks if cough does not resolve.\par Decision on need for follow-up CT based on clinical scenario.\par \par 35 minutes spent in evaluation management and review of studies.

## 2022-06-20 NOTE — HISTORY OF PRESENT ILLNESS
[TextBox_4] : 3 weeks ago went to [facility] and had negative covid and flu\par treated with z Abhishek without help,pt added Medrol Abhishek, finished over 1 week ago, pt thinks Medrol was \par \par still with cough, makes her become incontinent.\par very little mucus\par No wheeze\par No sob\par Some improvement with cough but still present. \par Dry cough.\par No GERD\par Freq. clearing throat\par Some upper airway congestion\par \par last CT 2021\par \par

## 2022-06-20 NOTE — PROCEDURE
[FreeTextEntry1] : 06/20/2022 \par PA and lateral chest radiograph \par \par 07/20/2021\par Pulmonary function testing\par FEV1, FVC, and FEV1/FVC are within normal limits. TLC and subdivisions are normal. RV/TLC ratio is normal. Single breath diffusion capacity is normal. \par PFT attached relatively stable function.\par

## 2022-06-20 NOTE — DISCUSSION/SUMMARY
[FreeTextEntry1] : Most likely cough related to post infectious inflammatory bronchitis.  Rule out component of laryngeal pharyngeal reflux disease.  Possible upper airway contribution.\par Less likely related to exacerbation of organizing pneumonia.

## 2022-10-10 ENCOUNTER — APPOINTMENT (OUTPATIENT)
Dept: CT IMAGING | Facility: CLINIC | Age: 82
End: 2022-10-10

## 2022-10-10 ENCOUNTER — APPOINTMENT (OUTPATIENT)
Dept: MRI IMAGING | Facility: CLINIC | Age: 82
End: 2022-10-10

## 2022-10-10 PROCEDURE — 72141 MRI NECK SPINE W/O DYE: CPT | Mod: MH

## 2022-10-12 ENCOUNTER — OUTPATIENT (OUTPATIENT)
Dept: OUTPATIENT SERVICES | Facility: HOSPITAL | Age: 82
LOS: 1 days | End: 2022-10-12
Payer: MEDICARE

## 2022-10-12 ENCOUNTER — APPOINTMENT (OUTPATIENT)
Dept: NUCLEAR MEDICINE | Facility: IMAGING CENTER | Age: 82
End: 2022-10-12

## 2022-10-12 DIAGNOSIS — M89.9 DISORDER OF BONE, UNSPECIFIED: ICD-10-CM

## 2022-10-12 PROCEDURE — 78815 PET IMAGE W/CT SKULL-THIGH: CPT

## 2022-10-12 PROCEDURE — 78815 PET IMAGE W/CT SKULL-THIGH: CPT | Mod: 26,PI,MH

## 2022-10-12 PROCEDURE — A9552: CPT

## 2022-11-22 ENCOUNTER — APPOINTMENT (OUTPATIENT)
Dept: PULMONOLOGY | Facility: CLINIC | Age: 82
End: 2022-11-22

## 2022-11-23 ENCOUNTER — APPOINTMENT (OUTPATIENT)
Dept: PULMONOLOGY | Facility: CLINIC | Age: 82
End: 2022-11-23

## 2023-06-19 ENCOUNTER — APPOINTMENT (OUTPATIENT)
Dept: PULMONOLOGY | Facility: CLINIC | Age: 83
End: 2023-06-19
Payer: MEDICARE

## 2023-06-19 DIAGNOSIS — M89.9 DISORDER OF BONE, UNSPECIFIED: ICD-10-CM

## 2023-06-19 PROCEDURE — 99213 OFFICE O/P EST LOW 20 MIN: CPT | Mod: 95

## 2023-06-19 NOTE — DISCUSSION/SUMMARY
[FreeTextEntry1] : On treatment for metastatic breast cancer.\par As per pet imaging report there are new parenchymal opacities.\par Patient presently not ill.\par Could be related to infectious etiology, drug-induced or organizing pneumonia for which patient has prior history.

## 2023-06-19 NOTE — PROCEDURE
[FreeTextEntry1] : Pet imaging June 16, 2023 noted.\par Films not available for review.\par Described a new patchy subpleural groundglass and consolidative opacities with minimal FDG uptake.  Right

## 2023-06-19 NOTE — HISTORY OF PRESENT ILLNESS
[Home] : at home, [unfilled] , at the time of the visit. [Medical Office: ([facility])___] : at the medical office located in  [TextBox_4] : This visit was provided via telehealth using real-time 2-way audio visual technology. The patient, FLEX KINGSTON , was located at home, [address]\[place] [address]  at the time of the visit.\par The provider, Angelo Pavon, was located at office [address] [place] [address] at the time of the visit. \par \par  The patient, Ms. FLEX KINGSTON  and Physician Angelo June participated in the telehealth encounter.\par \par Verbal consent obtained by  from patient\par \par On chemo and had PET scan.\par Treated at [facility] tab\par no significant resp, complaints.\par Prior cough.  Now improved.  Denies fever or chills.  Not ill.\par Denies chest pain or shortness of breath.\par Has follow-up appointment Wednesday at [facility].\par On [place]

## 2023-06-19 NOTE — ASSESSMENT
[FreeTextEntry1] : Since patient is clinically well I have recommended she follow-up at [facility].\adams Has appointment in 2 days.\adams If she wishes further recommendations from our office would need to follow-up here with copy of films and old films if possible.\par Patient is instructed to call should she develop any increasing respiratory symptoms.\adams Would hold on antibiotics for further therapy at this time.

## 2024-04-22 ENCOUNTER — APPOINTMENT (OUTPATIENT)
Dept: PULMONOLOGY | Facility: CLINIC | Age: 84
End: 2024-04-22
Payer: MEDICARE

## 2024-04-22 VITALS — SYSTOLIC BLOOD PRESSURE: 158 MMHG | HEART RATE: 63 BPM | OXYGEN SATURATION: 98 % | DIASTOLIC BLOOD PRESSURE: 91 MMHG

## 2024-04-22 DIAGNOSIS — C50.111 MALIGNANT NEOPLASM OF CENTRAL PORTION OF RIGHT FEMALE BREAST: ICD-10-CM

## 2024-04-22 DIAGNOSIS — J84.89 OTHER SPECIFIED INTERSTITIAL PULMONARY DISEASES: ICD-10-CM

## 2024-04-22 PROCEDURE — 99214 OFFICE O/P EST MOD 30 MIN: CPT

## 2024-04-25 NOTE — DISCUSSION/SUMMARY
[FreeTextEntry1] : On treatment for metastatic breast cancer. Patient presently not ill. Reported change in CT and concern regarding potential drug toxicity.  Cannot exclude infectious etiology or progressive metastatic disease.

## 2024-04-25 NOTE — ASSESSMENT
[FreeTextEntry1] : PFT presently refusing. Asked patient and daughter to obtain films as well as old films for review and comparison and attempt to make a recommendation regarding etiology of parenchymal infiltrates and ability to continue treatment versus change in therapy or biopsy. Will review all films when available.  Patient and daughter aware need actual films not report.    35 minutes spent in evaluation management and review of studies.

## 2024-04-25 NOTE — PROCEDURE
[FreeTextEntry1] : Patient had recent CAT scan April 16, 2024. Films as well as old films not available for direct review.  Have reports. Described left upper lobe bandlike consolidative opacity increased compared to PET scan February 2024 and prior CT October 2020.  Mild increase in right upper lobe subpleural nodule.  Subpleural reticular opacities bilaterally upper and lower lobes reportedly unchanged compared to February CT PET 2024 but new compared to 2020. No adenopathy reported.

## 2024-05-06 ENCOUNTER — APPOINTMENT (OUTPATIENT)
Dept: PULMONOLOGY | Facility: CLINIC | Age: 84
End: 2024-05-06
Payer: MEDICARE

## 2024-05-06 VITALS
DIASTOLIC BLOOD PRESSURE: 69 MMHG | BODY MASS INDEX: 23.56 KG/M2 | OXYGEN SATURATION: 96 % | WEIGHT: 133 LBS | HEART RATE: 70 BPM | SYSTOLIC BLOOD PRESSURE: 119 MMHG

## 2024-05-06 DIAGNOSIS — R93.89 ABNORMAL FINDINGS ON DIAGNOSTIC IMAGING OF OTHER SPECIFIED BODY STRUCTURES: ICD-10-CM

## 2024-05-06 LAB — POCT - HEMOGLOBIN (HGB), QUANTITATIVE, TRANSCUTANEOUS: 12.1

## 2024-05-06 PROCEDURE — 99213 OFFICE O/P EST LOW 20 MIN: CPT | Mod: 25

## 2024-05-06 PROCEDURE — 94727 GAS DIL/WSHOT DETER LNG VOL: CPT

## 2024-05-06 PROCEDURE — 88738 HGB QUANT TRANSCUTANEOUS: CPT

## 2024-05-06 PROCEDURE — 94010 BREATHING CAPACITY TEST: CPT

## 2024-05-06 PROCEDURE — 94729 DIFFUSING CAPACITY: CPT

## 2024-05-06 NOTE — PHYSICAL EXAM
[No Acute Distress] : no acute distress [Normal Oropharynx] : normal oropharynx [Normal Appearance] : normal appearance [No Neck Mass] : no neck mass [Normal Rate/Rhythm] : normal rate/rhythm [Normal S1, S2] : normal s1, s2 [No Murmurs] : no murmurs [No Resp Distress] : no resp distress [Normal to Percussion] : normal to percussion [No Abnormalities] : no abnormalities [Benign] : benign [Normal Gait] : normal gait [No Clubbing] : no clubbing [No Cyanosis] : no cyanosis [No Edema] : no edema [FROM] : FROM [Normal Color/ Pigmentation] : normal color/ pigmentation [No Focal Deficits] : no focal deficits [Oriented x3] : oriented x3 [Normal Affect] : normal affect